# Patient Record
Sex: FEMALE | Race: WHITE | Employment: FULL TIME | URBAN - METROPOLITAN AREA
[De-identification: names, ages, dates, MRNs, and addresses within clinical notes are randomized per-mention and may not be internally consistent; named-entity substitution may affect disease eponyms.]

---

## 2017-04-21 ENCOUNTER — HOSPITAL ENCOUNTER (OUTPATIENT)
Dept: GENERAL RADIOLOGY | Age: 62
Discharge: HOME | End: 2017-04-21
Attending: PHYSICIAN ASSISTANT
Payer: COMMERCIAL

## 2017-04-21 ENCOUNTER — OFFICE VISIT (OUTPATIENT)
Dept: FAMILY MEDICINE CLINIC | Facility: CLINIC | Age: 62
End: 2017-04-21

## 2017-04-21 VITALS
DIASTOLIC BLOOD PRESSURE: 60 MMHG | BODY MASS INDEX: 22 KG/M2 | RESPIRATION RATE: 16 BRPM | SYSTOLIC BLOOD PRESSURE: 108 MMHG | WEIGHT: 118 LBS | HEART RATE: 64 BPM

## 2017-04-21 DIAGNOSIS — M79.672 LEFT FOOT PAIN: Primary | ICD-10-CM

## 2017-04-21 DIAGNOSIS — M79.672 LEFT FOOT PAIN: ICD-10-CM

## 2017-04-21 DIAGNOSIS — M25.572 ACUTE LEFT ANKLE PAIN: ICD-10-CM

## 2017-04-21 PROCEDURE — 73610 X-RAY EXAM OF ANKLE: CPT

## 2017-04-21 PROCEDURE — 99213 OFFICE O/P EST LOW 20 MIN: CPT | Performed by: PHYSICIAN ASSISTANT

## 2017-04-21 PROCEDURE — 73630 X-RAY EXAM OF FOOT: CPT

## 2017-04-21 NOTE — PROGRESS NOTES
CC:  Patient presents with:  Sprain: possible sprain on Left ankle.  The patient states that her ankle twisted when she fell this morning      HPI: Sarai presents for complaints of left foot and ankle pain/swelling after falling while climbing stairs this No visible/palpable deformities. ROM limited in the ankle due to pain. No TTP over the Achilles or base of the 5th metatarsal. CV/neuro intact.   Neurological:  A&O x 3; normal gait and balance  Skin: Warm, dry, no rashes, lesions, or discoloration  Psychia

## 2017-04-24 ENCOUNTER — TELEPHONE (OUTPATIENT)
Dept: FAMILY MEDICINE CLINIC | Facility: CLINIC | Age: 62
End: 2017-04-24

## 2017-04-24 NOTE — TELEPHONE ENCOUNTER
CONCLUSION:  There is an 8 x 2 mm avulsion fracture involving the dorsal distal left painless. There is moderate adjacent soft tissue swelling. There is a 8mm enthesophyte at the insertion of the left Achilles tendon.

## 2017-04-24 NOTE — TELEPHONE ENCOUNTER
Elder Pop told her patient not in [place] so they want to see her tomorrow, I called the patient [facility] for her to call back so she can call Dr Earnest Velasco to schedule an appointment for tomorrow

## 2017-04-24 NOTE — TELEPHONE ENCOUNTER
Spoke with Pt and this and expressed that it is important Pt contact Dr Louise Hernandez office today and get appt for tomorrow.

## 2017-04-24 NOTE — TELEPHONE ENCOUNTER
Called Dr. Duncan Baltazar office and spoke Dr. Luis F Molina per Jg Perry PA-C patient has an left avulsion fx and that she is in a walking boot. Jg Perry PA-C would like to know how soon should patient schedule an appt to see Dr. Luis F Molina.     Dr. Luis F Molina

## 2017-04-24 NOTE — TELEPHONE ENCOUNTER
Pooja Leyva from Dr. Jacquelin Nice office called back and said that per Dr. Justin Zepeda patient has a talus fx and that the patient should schedule an appt in 1 week so next week Monday 5/1/17 to see Dr. Justin Zepeda and for patient to stay in the boot.     Notified [facility]

## 2017-04-24 NOTE — TELEPHONE ENCOUNTER
Patient called in to check her next step for her ankle.   She saw Teena Mcqueen and she just wants to know if she needs to see an orthopedic  Please call to discuss

## 2017-10-23 ENCOUNTER — TELEPHONE (OUTPATIENT)
Dept: FAMILY MEDICINE CLINIC | Facility: CLINIC | Age: 62
End: 2017-10-23

## 2017-10-23 DIAGNOSIS — Z12.11 COLON CANCER SCREENING: Primary | ICD-10-CM

## 2017-10-23 NOTE — TELEPHONE ENCOUNTER
Patient scheduled physical for 12/5 with Dr. Nicole Bernard. Patient would like to know if referral for colonoscopy can be entered before her appt. Or does patient need to wait until seen by Dr. Nicole Bernard?

## 2017-10-23 NOTE — TELEPHONE ENCOUNTER
Pt requested referral to GI for Colonoscopy. LOV 04/21/17 and next appt 12/05/17 for physical. Will you give referral ?  Routed to Dr Naldo Costa.

## 2017-11-25 ENCOUNTER — TELEPHONE (OUTPATIENT)
Dept: FAMILY MEDICINE CLINIC | Facility: CLINIC | Age: 62
End: 2017-11-25

## 2017-11-25 NOTE — TELEPHONE ENCOUNTER
Got message about eye issue on call. Spray paint in eye. Has been rinsing.   Eye irritated, but vision does not seem to be affected  She feels fine now  Recommended if irritation persists or if vision changes, get emergency room help  Hold cold compress t

## 2017-12-05 ENCOUNTER — OFFICE VISIT (OUTPATIENT)
Dept: FAMILY MEDICINE CLINIC | Facility: CLINIC | Age: 62
End: 2017-12-05

## 2017-12-05 VITALS
DIASTOLIC BLOOD PRESSURE: 60 MMHG | HEART RATE: 84 BPM | BODY MASS INDEX: 21.25 KG/M2 | SYSTOLIC BLOOD PRESSURE: 90 MMHG | HEIGHT: 61.5 IN | TEMPERATURE: 98 F | WEIGHT: 114 LBS

## 2017-12-05 DIAGNOSIS — Z00.00 ROUTINE GENERAL MEDICAL EXAMINATION AT A HEALTH CARE FACILITY: Primary | ICD-10-CM

## 2017-12-05 DIAGNOSIS — Z12.11 COLON CANCER SCREENING: ICD-10-CM

## 2017-12-05 DIAGNOSIS — L85.3 DRY SKIN: ICD-10-CM

## 2017-12-05 DIAGNOSIS — Z78.0 POSTMENOPAUSAL ESTROGEN DEFICIENCY: ICD-10-CM

## 2017-12-05 DIAGNOSIS — Z12.31 VISIT FOR SCREENING MAMMOGRAM: ICD-10-CM

## 2017-12-05 DIAGNOSIS — Z00.00 LABORATORY EXAM ORDERED AS PART OF ROUTINE GENERAL MEDICAL EXAMINATION: ICD-10-CM

## 2017-12-05 PROCEDURE — 99396 PREV VISIT EST AGE 40-64: CPT | Performed by: FAMILY MEDICINE

## 2017-12-05 NOTE — PROGRESS NOTES
HPI:   Geovanna Allen is a 58year old female who presents for a complete physical exam.   Last pap:  2/2016, normal, neg HPV  Last mammogram:  1/2014; declines further mammogram.    Previous colonoscopy:  Age 48, does not want to have colon now.   Willing to yes.      REVIEW OF SYSTEMS:   GENERAL: feels well otherwise  SKIN: denies any unusual skin lesions  EYES:no vision problems  LUNGS: denies shortness of breath  CARDIOVASCULAR: denies chest pain  GI: denies abdominal pain,  No constipation or diarrhea  : requested or ordered in this encounter    Imaging & Consults:  XR DEXA BONE DENSITOMETRY (CPT=77080)  LUIS SCREENING BILAT (CPT=77067)

## 2017-12-09 ENCOUNTER — APPOINTMENT (OUTPATIENT)
Dept: LAB | Facility: HOSPITAL | Age: 62
End: 2017-12-09
Attending: FAMILY MEDICINE
Payer: COMMERCIAL

## 2017-12-09 DIAGNOSIS — Z12.11 COLON CANCER SCREENING: ICD-10-CM

## 2017-12-09 PROCEDURE — 82272 OCCULT BLD FECES 1-3 TESTS: CPT

## 2018-07-27 ENCOUNTER — TELEPHONE (OUTPATIENT)
Dept: FAMILY MEDICINE CLINIC | Facility: CLINIC | Age: 63
End: 2018-07-27

## 2018-07-27 DIAGNOSIS — Z80.3 FAMILY HISTORY OF BREAST CANCER IN SISTER: Primary | ICD-10-CM

## 2018-07-27 NOTE — TELEPHONE ENCOUNTER
[facility] [address] [facility]  [place], [address]  [phone]  Called and talked to patient gave her the information to contact genetic counselor

## 2018-07-27 NOTE — TELEPHONE ENCOUNTER
Sister  of breast cancer a few weeks so she would like to be tested (el) for the cancer gene. Wants to know who she would see for this?

## 2018-08-03 ENCOUNTER — GENETICS ENCOUNTER (OUTPATIENT)
Dept: GENETICS | Facility: HOSPITAL | Age: 63
End: 2018-08-03
Attending: GENETIC COUNSELOR, MS
Payer: COMMERCIAL

## 2018-08-03 DIAGNOSIS — Z80.3 FAMILY HISTORY OF BREAST CANCER: Primary | ICD-10-CM

## 2018-08-03 DIAGNOSIS — Z80.0 FAMILY HISTORY OF COLORECTAL CANCER: ICD-10-CM

## 2018-08-03 PROCEDURE — 96040 HC GENETIC COUNSELING EA 30 MIN: CPT | Performed by: GENETIC COUNSELOR, MS

## 2018-08-03 NOTE — PROGRESS NOTES
Referring Provider: Dr. Bess Hollins     Reason for Referral:  Ms. Gabby Smith was referred for genetic counseling because of a family history of breast and colorectal cancer. Ms. Torsten Huggins is a 61year-old woman of [place] descent.       Medical H cancer. There is no known Ashkenazi Rastafari ancestry. Psychosocial History:   Ms. Bianca Echevarria is semi-retired and works part-time as a . She is . Counseling: The following information was discussed with Ms. Bianca Echevarria.     Genetic cancer. Ms. Timothy Maloney was also concerned about her risk for breast cancer, given a mother and a sister with breast cancer. Genetic Testing (Panel):   The pros, cons, and limitations of genetic testing were discussed including the potential implication she would follow the recommendations in the event of a mutation. MsSonja Erik Piper indicated that if she had a mutation in a high risk breast cancer gene, she would likely pursue risk-reducing mastectomy, as she feels that mammograms are often inaccurate.    She in the sample. I anticipate that Ms. Vaz's results will be available within 2-3 weeks and will call her with the results. Results will also be communicated to Ms. Vaz’s physicians. Approximately 50 minutes was spent in consultation with Ms. Blayne Jackson

## 2018-08-15 ENCOUNTER — GENETICS ENCOUNTER (OUTPATIENT)
Dept: GENETICS | Facility: HOSPITAL | Age: 63
End: 2018-08-15
Attending: GENETIC COUNSELOR, MS
Payer: COMMERCIAL

## 2018-08-15 NOTE — PROGRESS NOTES
Referring Provider: Dr. Ирина Gallegos     Reason for Referral:  I met with Ms. Nesha Larose  briefly today for coordination and consenting for genetic testing for hereditary cancer.     Ms. Maxine Shoemaker had previously met with me on 8/3/18 to discuss genetic testi

## 2018-08-31 ENCOUNTER — GENETICS ENCOUNTER (OUTPATIENT)
Dept: HEMATOLOGY/ONCOLOGY | Facility: HOSPITAL | Age: 63
End: 2018-08-31

## 2018-08-31 NOTE — PROGRESS NOTES
Referring Provider: Dr. Rachid Spencer     Reason for Referral:  Ms. Loi Del Real had [facility] Common Hereditary Cancers panel genetic testing performed on 08/15/18 because of a family history of breast and colon cancer.        Genetic Testing Result:  No has not been reported in clinvar. At this time, no alteration in Ms. Vaz’s medical recommendations should be made based on the variant.   As more families are tested and/or functional studies are performed, it is possible that the variant will be reclass

## 2018-10-09 PROBLEM — D12.2 BENIGN NEOPLASM OF ASCENDING COLON: Status: ACTIVE | Noted: 2018-10-09

## 2018-10-09 PROBLEM — Z80.0 FAMILY HISTORY OF MALIGNANT NEOPLASM OF DIGESTIVE ORGAN: Status: ACTIVE | Noted: 2018-10-09

## 2018-10-09 PROBLEM — Z83.719 FAMILY HISTORY OF COLONIC POLYPS: Status: ACTIVE | Noted: 2018-10-09

## 2018-10-09 PROBLEM — Z83.71 FAMILY HISTORY OF COLONIC POLYPS: Status: ACTIVE | Noted: 2018-10-09

## 2018-10-09 PROBLEM — Z12.11 SPECIAL SCREENING FOR MALIGNANT NEOPLASM OF COLON: Status: ACTIVE | Noted: 2018-10-09

## 2019-03-07 ENCOUNTER — OFFICE VISIT (OUTPATIENT)
Dept: FAMILY MEDICINE CLINIC | Facility: CLINIC | Age: 64
End: 2019-03-07
Payer: COMMERCIAL

## 2019-03-07 VITALS
HEIGHT: 61.5 IN | BODY MASS INDEX: 22.74 KG/M2 | RESPIRATION RATE: 16 BRPM | OXYGEN SATURATION: 96 % | SYSTOLIC BLOOD PRESSURE: 104 MMHG | WEIGHT: 122 LBS | TEMPERATURE: 98 F | DIASTOLIC BLOOD PRESSURE: 64 MMHG | HEART RATE: 89 BPM

## 2019-03-07 DIAGNOSIS — J06.9 UPPER RESPIRATORY TRACT INFECTION, UNSPECIFIED TYPE: Primary | ICD-10-CM

## 2019-03-07 PROCEDURE — 99213 OFFICE O/P EST LOW 20 MIN: CPT | Performed by: PHYSICIAN ASSISTANT

## 2019-03-07 PROCEDURE — 94640 AIRWAY INHALATION TREATMENT: CPT | Performed by: PHYSICIAN ASSISTANT

## 2019-03-07 RX ORDER — ALBUTEROL SULFATE 90 UG/1
2 AEROSOL, METERED RESPIRATORY (INHALATION) EVERY 4 HOURS PRN
Qty: 1 INHALER | Refills: 6 | Status: SHIPPED | OUTPATIENT
Start: 2019-03-07 | End: 2019-06-11 | Stop reason: ALTCHOICE

## 2019-03-07 RX ORDER — ALBUTEROL SULFATE 2.5 MG/3ML
2.5 SOLUTION RESPIRATORY (INHALATION) ONCE
Status: COMPLETED | OUTPATIENT
Start: 2019-03-07 | End: 2019-03-07

## 2019-03-07 RX ADMIN — ALBUTEROL SULFATE 2.5 MG: 2.5 SOLUTION RESPIRATORY (INHALATION) at 12:21:00

## 2019-03-07 NOTE — PROGRESS NOTES
CC: Cough     HISTORY OF PRESENT ILLNESS  Zeferino Eller is a 61year old female who presents for evaluation of cough. For the past week, she has been having cough with associated body aches, nasal congestion, and chills.   She feels that most of her symptoms Posterior oropharynx clear without exudate or erythema. NECK: Supple without lymphadenopathy. CARDIOVASCULAR: Regular rate and rhythm, no murmurs/ rubs/ gallops. PULMONARY: Normal effort on room air. Clear to auscultation bilaterally.  No adventitious br

## 2019-06-11 ENCOUNTER — OFFICE VISIT (OUTPATIENT)
Dept: FAMILY MEDICINE CLINIC | Facility: CLINIC | Age: 64
End: 2019-06-11
Payer: COMMERCIAL

## 2019-06-11 VITALS
BODY MASS INDEX: 21.93 KG/M2 | SYSTOLIC BLOOD PRESSURE: 110 MMHG | RESPIRATION RATE: 16 BRPM | DIASTOLIC BLOOD PRESSURE: 60 MMHG | TEMPERATURE: 98 F | WEIGHT: 119.19 LBS | HEART RATE: 84 BPM | HEIGHT: 62 IN

## 2019-06-11 DIAGNOSIS — K11.20 INFECTION OF PAROTID GLAND: Primary | ICD-10-CM

## 2019-06-11 PROCEDURE — 99214 OFFICE O/P EST MOD 30 MIN: CPT | Performed by: FAMILY MEDICINE

## 2019-06-11 RX ORDER — AMOXICILLIN 500 MG/1
1 CAPSULE ORAL 3 TIMES DAILY
Refills: 0 | COMMUNITY
Start: 2019-06-10 | End: 2019-06-11 | Stop reason: ALTCHOICE

## 2019-06-11 RX ORDER — CEFUROXIME AXETIL 250 MG/1
250 TABLET ORAL 2 TIMES DAILY
Qty: 20 TABLET | Refills: 0 | Status: SHIPPED | OUTPATIENT
Start: 2019-06-11 | End: 2019-10-17

## 2019-06-11 NOTE — PROGRESS NOTES
Clement Macario is a 61year old female. S:  Patient presents today with the following concerns:  Swelling (R facial swelling anterior to R ear and neck beginning 2 days ago. Pain with chewing.  Lethargic, fever and chills)  Began Sunday morning when tried affect, and behavior are normal.  SKIN: no rashes,no suspicious lesions  HEENT: atraumatic, normocephalic,ears and throat are clear. No oral lesions. The right parotid gland is swollen and tender to the touch. No other salivary gland enlargement.   EYES:

## 2019-09-09 ENCOUNTER — TELEPHONE (OUTPATIENT)
Dept: FAMILY MEDICINE CLINIC | Facility: CLINIC | Age: 64
End: 2019-09-09

## 2019-10-17 ENCOUNTER — OFFICE VISIT (OUTPATIENT)
Dept: FAMILY MEDICINE CLINIC | Facility: CLINIC | Age: 64
End: 2019-10-17
Payer: COMMERCIAL

## 2019-10-17 VITALS
RESPIRATION RATE: 16 BRPM | HEART RATE: 72 BPM | SYSTOLIC BLOOD PRESSURE: 100 MMHG | WEIGHT: 117 LBS | DIASTOLIC BLOOD PRESSURE: 60 MMHG | BODY MASS INDEX: 21.81 KG/M2 | HEIGHT: 61.25 IN | TEMPERATURE: 98 F

## 2019-10-17 DIAGNOSIS — M72.2 PLANTAR FASCIITIS, BILATERAL: ICD-10-CM

## 2019-10-17 DIAGNOSIS — Z12.31 VISIT FOR SCREENING MAMMOGRAM: ICD-10-CM

## 2019-10-17 DIAGNOSIS — Z11.51 SCREENING FOR HUMAN PAPILLOMAVIRUS (HPV): ICD-10-CM

## 2019-10-17 DIAGNOSIS — Z00.00 ROUTINE GENERAL MEDICAL EXAMINATION AT A HEALTH CARE FACILITY: Primary | ICD-10-CM

## 2019-10-17 DIAGNOSIS — E78.5 DYSLIPIDEMIA: ICD-10-CM

## 2019-10-17 DIAGNOSIS — G56.03 BILATERAL CARPAL TUNNEL SYNDROME: ICD-10-CM

## 2019-10-17 PROCEDURE — 88175 CYTOPATH C/V AUTO FLUID REDO: CPT | Performed by: FAMILY MEDICINE

## 2019-10-17 PROCEDURE — 87624 HPV HI-RISK TYP POOLED RSLT: CPT | Performed by: FAMILY MEDICINE

## 2019-10-17 PROCEDURE — 99396 PREV VISIT EST AGE 40-64: CPT | Performed by: FAMILY MEDICINE

## 2019-10-17 NOTE — PROGRESS NOTES
HPI:   Yazmin Vallejo is a 59year old female who presents for a complete physical exam.   Last pap:  2/2016, normal, neg HPV - done today 10/17/19  Last mammogram:  1/2014; has declined mammogram   Previous colonoscopy:  10/9/18 - repeat in 3 years.   Previo Date   •      • COLONOSCOPY     • LUIS BIOPSY STEREO NODULE 1 SITE RIGHT (CPT=19081)      benign      Family History   Problem Relation Age of Onset   • Cancer Mother         colon, lung, breast, cervix   • Colon Cancer Mother    • Breast Cance uterus is normal size, shape, consistency and nontender, ADNEXA: normal adnexa in size, nontender and no masses  EXTREMITIES: no edema    TTP at insertion of plantar fascia b/l  ASSESSMENT AND PLAN:   Jl Bills is a 59year old female who presents for a

## 2019-11-15 ENCOUNTER — HOSPITAL ENCOUNTER (OUTPATIENT)
Dept: MAMMOGRAPHY | Facility: HOSPITAL | Age: 64
Discharge: HOME | End: 2019-11-15
Attending: FAMILY MEDICINE
Payer: COMMERCIAL

## 2019-11-15 DIAGNOSIS — Z12.31 VISIT FOR SCREENING MAMMOGRAM: ICD-10-CM

## 2019-11-15 PROCEDURE — 77063 BREAST TOMOSYNTHESIS BI: CPT | Performed by: FAMILY MEDICINE

## 2019-11-15 PROCEDURE — 77067 SCR MAMMO BI INCL CAD: CPT | Performed by: FAMILY MEDICINE

## 2019-11-18 ENCOUNTER — HOSPITAL ENCOUNTER (OUTPATIENT)
Dept: CT IMAGING | Facility: HOSPITAL | Age: 64
Discharge: HOME | End: 2019-11-18
Attending: FAMILY MEDICINE

## 2019-11-18 DIAGNOSIS — Z13.9 ENCOUNTER FOR SCREENING: ICD-10-CM

## 2019-12-10 ENCOUNTER — TELEPHONE (OUTPATIENT)
Dept: FAMILY MEDICINE CLINIC | Facility: CLINIC | Age: 64
End: 2019-12-10

## 2019-12-10 NOTE — TELEPHONE ENCOUNTER
fyi   Patient is scheduled for tomorrow to see Lalitha Mendoza for a fall she had last Thursday. Patient stated she fell on her back and now she's experiencing upper back and neck pain.

## 2019-12-10 NOTE — TELEPHONE ENCOUNTER
Called and talked to patient she is taking ibuprofen 200 mg BID I encouraged her to increase it to 600 mg BID and we can see her tomorrow

## 2019-12-11 ENCOUNTER — OFFICE VISIT (OUTPATIENT)
Dept: FAMILY MEDICINE CLINIC | Facility: CLINIC | Age: 64
End: 2019-12-11
Payer: COMMERCIAL

## 2019-12-11 VITALS
HEIGHT: 61 IN | HEART RATE: 60 BPM | SYSTOLIC BLOOD PRESSURE: 100 MMHG | TEMPERATURE: 99 F | DIASTOLIC BLOOD PRESSURE: 60 MMHG | BODY MASS INDEX: 22.28 KG/M2 | WEIGHT: 118 LBS

## 2019-12-11 DIAGNOSIS — M79.18 PAIN OF PARASPINAL MUSCLE: ICD-10-CM

## 2019-12-11 DIAGNOSIS — M53.3 SACRAL PAIN: ICD-10-CM

## 2019-12-11 DIAGNOSIS — S46.819A STRAIN OF TRAPEZIUS MUSCLE, UNSPECIFIED LATERALITY, INITIAL ENCOUNTER: ICD-10-CM

## 2019-12-11 DIAGNOSIS — R51.9 DAILY HEADACHE: ICD-10-CM
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
## 2019-12-11 PROCEDURE — 99214 OFFICE O/P EST MOD 30 MIN: CPT | Performed by: PHYSICIAN ASSISTANT

## 2019-12-11 RX ORDER — CYCLOBENZAPRINE HCL 5 MG
5 TABLET ORAL NIGHTLY PRN
Qty: 20 TABLET | Refills: 0 | Status: SHIPPED | OUTPATIENT
Start: 2019-12-11 | End: 2020-11-03 | Stop reason: ALTCHOICE

## 2019-12-11 NOTE — PROGRESS NOTES
Patient presents with:  Fall: fell last thursday,  still sore on left side and upper shoulders and neck  Headache: constant dull headache since fall 6 days ago       [address] [place] [address] Lisseth is a 59year old female who presents for evaluati appropriately dressed. HEENT: Normocephalic, atraumatic. PERRL. Moist mucous membranes. Posterior oropharynx pink without exudates or inflammation. NECK: Supple without lymphadenopathy. CARDIOVASCULAR: Regular rate and rhythm.   PULMONOLOGY: Normal effor

## 2019-12-11 NOTE — PATIENT INSTRUCTIONS
Heat 15 minutes 3 times a day- upper back/ mid back  Ice 15 minutes - tail bone if needed. Topical analgesics (pain)- biofreeze, lidocaine gel, patches, aspercreme, bengay.     If you feel that you need an anti-inflammatory or if the muscle relaxer does

## 2020-02-10 ENCOUNTER — TELEPHONE (OUTPATIENT)
Dept: FAMILY MEDICINE CLINIC | Facility: CLINIC | Age: 65
End: 2020-02-10

## 2020-02-10 NOTE — TELEPHONE ENCOUNTER
Patient is calling asking for a recommendation on a dermatologist. Patient has a skin tag in eye that is getting bigger and would like to have it removed.

## 2020-02-10 NOTE — TELEPHONE ENCOUNTER
[facility]    [address] [address] [address]  [place], [address] [place] [address] Rd    [phone]    Patient notified.

## 2020-07-08 ENCOUNTER — LAB ENCOUNTER (OUTPATIENT)
Dept: LAB | Facility: HOSPITAL | Age: 65
End: 2020-07-08
Attending: PHYSICIAN ASSISTANT
Payer: COMMERCIAL

## 2020-07-08 ENCOUNTER — TELEPHONE (OUTPATIENT)
Dept: FAMILY MEDICINE CLINIC | Facility: CLINIC | Age: 65
End: 2020-07-08

## 2020-07-08 DIAGNOSIS — J02.9 SORE THROAT: ICD-10-CM

## 2020-07-08 DIAGNOSIS — R52 BODY ACHES: ICD-10-CM

## 2020-07-08 DIAGNOSIS — R52 BODY ACHES: Primary | ICD-10-CM

## 2020-07-08 NOTE — TELEPHONE ENCOUNTER
Called and told patient that someone from the hospital will call her and set up a time for her to come in to get tested. Then we will call her and set up a virtual visit with Yuri Nolen.

## 2020-07-08 NOTE — TELEPHONE ENCOUNTER
OK will order testing now. Will need to follow up via virtual visit once results return if still having symptoms/ returns positive.

## 2020-07-08 NOTE — TELEPHONE ENCOUNTER
Pt requesting to be tested for Covid 19 as she is having symptoms (body aches,sore throat,fatigued,headache)

## 2020-07-14 LAB — SARS-COV-2 BY PCR: NOT DETECTED

## 2020-07-16 NOTE — PROGRESS NOTES
Results reviewed with patient per phone. Patient verbalized understanding. Pt no longer has symptoms

## 2020-09-04 ENCOUNTER — TELEPHONE (OUTPATIENT)
Dept: FAMILY MEDICINE CLINIC | Facility: CLINIC | Age: 65
End: 2020-09-04

## 2020-09-04 DIAGNOSIS — Z00.00 ROUTINE GENERAL MEDICAL EXAMINATION AT A HEALTH CARE FACILITY: Primary | ICD-10-CM

## 2020-09-04 NOTE — TELEPHONE ENCOUNTER
No message in this routed call
Please enter lab orders for the patient's upcoming physical appointment. Physical scheduled:    Your appointments     Date & Time Appointment Department [facility])    Nov 03, 2020  8:45 AM CST Physical - Established with Dell Ibanez MD [address] [place] [address]
Pt is now 72. Will this be a physical or a medicare wellness?
This will be a physical. Pt is still working and does not qualify for Medicare part B
No

## 2020-11-03 ENCOUNTER — OFFICE VISIT (OUTPATIENT)
Dept: FAMILY MEDICINE CLINIC | Facility: CLINIC | Age: 65
End: 2020-11-03
Payer: COMMERCIAL

## 2020-11-03 VITALS
DIASTOLIC BLOOD PRESSURE: 78 MMHG | WEIGHT: 115.19 LBS | BODY MASS INDEX: 21.2 KG/M2 | SYSTOLIC BLOOD PRESSURE: 120 MMHG | HEIGHT: 61.75 IN | RESPIRATION RATE: 16 BRPM | HEART RATE: 68 BPM | TEMPERATURE: 98 F

## 2020-11-03 DIAGNOSIS — Z00.00 ROUTINE GENERAL MEDICAL EXAMINATION AT A HEALTH CARE FACILITY: Primary | ICD-10-CM

## 2020-11-03 DIAGNOSIS — Z12.31 VISIT FOR SCREENING MAMMOGRAM: ICD-10-CM

## 2020-11-03 DIAGNOSIS — H93.11 TINNITUS OF RIGHT EAR: ICD-10-CM

## 2020-11-03 PROBLEM — Z12.11 SPECIAL SCREENING FOR MALIGNANT NEOPLASM OF COLON: Status: RESOLVED | Noted: 2018-10-09 | Resolved: 2020-11-03

## 2020-11-03 PROCEDURE — 3078F DIAST BP <80 MM HG: CPT | Performed by: FAMILY MEDICINE

## 2020-11-03 PROCEDURE — 3074F SYST BP LT 130 MM HG: CPT | Performed by: FAMILY MEDICINE

## 2020-11-03 PROCEDURE — 99397 PER PM REEVAL EST PAT 65+ YR: CPT | Performed by: FAMILY MEDICINE

## 2020-11-03 PROCEDURE — 3008F BODY MASS INDEX DOCD: CPT | Performed by: FAMILY MEDICINE

## 2020-11-03 PROCEDURE — 99072 ADDL SUPL MATRL&STAF TM PHE: CPT | Performed by: FAMILY MEDICINE

## 2020-11-03 RX ORDER — MULTIVIT WITH MINERALS/LUTEIN
1000 TABLET ORAL DAILY
COMMUNITY

## 2020-11-03 RX ORDER — MULTIVITAMIN
1 TABLET ORAL DAILY
COMMUNITY

## 2020-11-03 NOTE — PROGRESS NOTES
HPI:   Alvaro Mata is a 72year old female who presents for a complete physical exam.   Last pap:  10/17/19  Last mammogram:  11/2019   Previous colonoscopy:  10/9/18 - repeat in 3 years. Previous DEXA:  Previously ordered, not completed.   Current or pre Medical History:   Diagnosis Date   • Pain in joints       Past Surgical History:   Procedure Laterality Date   •      • COLONOSCOPY     • LUIS BIOPSY STEREO NODULE 1 SITE RIGHT (CPT=19081)      benign      Family History   Problem Relation Age diagnosis)  Visit for screening mammogram  Tinnitus of right ear  · Pap UTD  · Mammogram:  Recommend yearly - pt does not plan to have done this year. Ordered if she changes her mind. · Dexascan:  /. Recommended daily Calcium and Vit D supplements.   · L

## 2021-02-14 DIAGNOSIS — Z23 NEED FOR VACCINATION: ICD-10-CM

## 2021-10-29 ENCOUNTER — LAB ENCOUNTER (OUTPATIENT)
Dept: LAB | Age: 66
End: 2021-10-29
Attending: INTERNAL MEDICINE
Payer: COMMERCIAL

## 2021-10-29 DIAGNOSIS — Z01.818 PRE-OP TESTING: ICD-10-CM

## 2021-11-01 PROBLEM — D12.3 BENIGN NEOPLASM OF TRANSVERSE COLON: Status: ACTIVE | Noted: 2021-11-01

## 2021-11-01 PROBLEM — Z86.010 HISTORY OF ADENOMATOUS POLYP OF COLON: Status: ACTIVE | Noted: 2021-11-01

## 2021-11-01 PROBLEM — Z80.0 FAMILY HISTORY OF COLON CANCER: Status: ACTIVE | Noted: 2021-11-01

## 2021-11-01 PROBLEM — D12.8 BENIGN NEOPLASM OF RECTUM: Status: ACTIVE | Noted: 2021-11-01

## 2021-11-01 PROBLEM — Z86.0101 HISTORY OF ADENOMATOUS POLYP OF COLON: Status: ACTIVE | Noted: 2021-11-01

## 2021-11-01 PROBLEM — D12.4 BENIGN NEOPLASM OF DESCENDING COLON: Status: ACTIVE | Noted: 2021-11-01

## 2022-11-12 LAB
AMB EXT CHOLESTEROL, TOTAL: 235 MG/DL
AMB EXT HDL CHOLESTEROL: 53 MG/DL
AMB EXT LDL CHOLESTEROL, DIRECT: 170 MG/DL
AMB EXT TRIGLYCERIDES: 72 MG/DL

## 2022-11-14 ENCOUNTER — TELEPHONE (OUTPATIENT)
Dept: FAMILY MEDICINE CLINIC | Facility: CLINIC | Age: 67
End: 2022-11-14

## 2022-11-14 NOTE — TELEPHONE ENCOUNTER
Received fax from [address] [place] [address] with patient's test results, placed in Dr. Stacey Brown in box.  Patient has physical 12/10/22

## 2022-11-22 LAB — AMB EXT GLUCOSE: 85 MG/DL

## 2022-11-22 NOTE — TELEPHONE ENCOUNTER
Labs reviewed.   Metabolic panel normal, including glucose 85  Triglycerides 72  Total cholesterol 235  HDL 53    Normal blood count

## 2022-12-10 ENCOUNTER — OFFICE VISIT (OUTPATIENT)
Dept: FAMILY MEDICINE CLINIC | Facility: CLINIC | Age: 67
End: 2022-12-10
Payer: COMMERCIAL

## 2022-12-10 VITALS
DIASTOLIC BLOOD PRESSURE: 60 MMHG | HEART RATE: 72 BPM | WEIGHT: 117.38 LBS | BODY MASS INDEX: 21.88 KG/M2 | TEMPERATURE: 97 F | RESPIRATION RATE: 16 BRPM | SYSTOLIC BLOOD PRESSURE: 104 MMHG | HEIGHT: 61.5 IN

## 2022-12-10 DIAGNOSIS — Z11.51 SCREENING FOR HPV (HUMAN PAPILLOMAVIRUS): ICD-10-CM

## 2022-12-10 DIAGNOSIS — Z78.0 POSTMENOPAUSAL ESTROGEN DEFICIENCY: ICD-10-CM

## 2022-12-10 DIAGNOSIS — E78.00 PURE HYPERCHOLESTEROLEMIA: ICD-10-CM

## 2022-12-10 DIAGNOSIS — Z12.31 VISIT FOR SCREENING MAMMOGRAM: ICD-10-CM

## 2022-12-10 DIAGNOSIS — Z00.00 ROUTINE GENERAL MEDICAL EXAMINATION AT A HEALTH CARE FACILITY: Primary | ICD-10-CM

## 2022-12-10 DIAGNOSIS — N95.0 POSTMENOPAUSAL BLEEDING: ICD-10-CM

## 2022-12-10 DIAGNOSIS — R10.2 ADNEXAL PAIN: ICD-10-CM

## 2022-12-10 DIAGNOSIS — Z12.4 SCREENING FOR CERVICAL CANCER: ICD-10-CM

## 2022-12-10 DIAGNOSIS — R31.9 HEMATURIA, UNSPECIFIED TYPE: ICD-10-CM

## 2022-12-10 PROBLEM — D12.4 BENIGN NEOPLASM OF DESCENDING COLON: Status: RESOLVED | Noted: 2021-11-01 | Resolved: 2022-12-10

## 2022-12-10 PROBLEM — Z80.0 FAMILY HISTORY OF MALIGNANT NEOPLASM OF DIGESTIVE ORGAN: Status: RESOLVED | Noted: 2018-10-09 | Resolved: 2022-12-10

## 2022-12-10 PROBLEM — Z83.719 FAMILY HISTORY OF COLONIC POLYPS: Status: RESOLVED | Noted: 2018-10-09 | Resolved: 2022-12-10

## 2022-12-10 PROBLEM — D12.8 BENIGN NEOPLASM OF RECTUM: Status: RESOLVED | Noted: 2021-11-01 | Resolved: 2022-12-10

## 2022-12-10 PROBLEM — Z83.71 FAMILY HISTORY OF COLONIC POLYPS: Status: RESOLVED | Noted: 2018-10-09 | Resolved: 2022-12-10

## 2022-12-10 PROBLEM — D12.2 BENIGN NEOPLASM OF ASCENDING COLON: Status: RESOLVED | Noted: 2018-10-09 | Resolved: 2022-12-10

## 2022-12-10 PROBLEM — D12.3 BENIGN NEOPLASM OF TRANSVERSE COLON: Status: RESOLVED | Noted: 2021-11-01 | Resolved: 2022-12-10

## 2022-12-10 LAB
APPEARANCE: YELLOW
BILIRUBIN: NEGATIVE
GLUCOSE (URINE DIPSTICK): NEGATIVE MG/DL
KETONES (URINE DIPSTICK): NEGATIVE MG/DL
LEUKOCYTES: NEGATIVE
MULTISTIX LOT#: NORMAL NUMERIC
NITRITE, URINE: NEGATIVE
OCCULT BLOOD: NEGATIVE
PH, URINE: 6.5 (ref 4.5–8)
PROTEIN (URINE DIPSTICK): NEGATIVE MG/DL
SPECIFIC GRAVITY: 1 (ref 1–1.03)
URINE-COLOR: CLEAR
UROBILINOGEN,SEMI-QN: 0.2 MG/DL (ref 0–1.9)

## 2022-12-10 PROCEDURE — 81003 URINALYSIS AUTO W/O SCOPE: CPT | Performed by: FAMILY MEDICINE

## 2022-12-10 PROCEDURE — 3078F DIAST BP <80 MM HG: CPT | Performed by: FAMILY MEDICINE

## 2022-12-10 PROCEDURE — 99397 PER PM REEVAL EST PAT 65+ YR: CPT | Performed by: FAMILY MEDICINE

## 2022-12-10 PROCEDURE — 87624 HPV HI-RISK TYP POOLED RSLT: CPT | Performed by: FAMILY MEDICINE

## 2022-12-10 PROCEDURE — 3074F SYST BP LT 130 MM HG: CPT | Performed by: FAMILY MEDICINE

## 2022-12-10 PROCEDURE — 3008F BODY MASS INDEX DOCD: CPT | Performed by: FAMILY MEDICINE

## 2022-12-10 NOTE — PATIENT INSTRUCTIONS
A heart scan, a CT scan of the heart, is the safest and most accurate screening tool for detecting the early build-up of calcium in the coronary arteries, the most common cause of heart disease. This simple, painless and potentially lifesaving test takes just 15 minutes.     To schedule call [phone]    Heart scan locations:  [address] [place] [address] (enter through the Emergency Dept.)    Make an appointment for a heart scan if you are male over age 36 or a female over age 39, and have one or more of these risk factors:  High blood pressure  High cholesterol  Smoking  Obesity  Diabetes  Family history of heart disease

## 2022-12-12 LAB — HPV I/H RISK 1 DNA SPEC QL NAA+PROBE: NEGATIVE

## 2022-12-23 ENCOUNTER — HOSPITAL ENCOUNTER (OUTPATIENT)
Dept: CT IMAGING | Age: 67
Discharge: HOME | End: 2022-12-23
Attending: FAMILY MEDICINE

## 2022-12-23 ENCOUNTER — TELEPHONE (OUTPATIENT)
Dept: FAMILY MEDICINE CLINIC | Facility: CLINIC | Age: 67
End: 2022-12-23

## 2022-12-23 DIAGNOSIS — Z13.6 SCREENING FOR CARDIOVASCULAR CONDITION: ICD-10-CM

## 2022-12-23 NOTE — TELEPHONE ENCOUNTER
Nodule in lung noted on heart scan. No significant change over 2 years. Recommendation is another follow-up in 1 year to ensure no change in size. Please notify patient. No MyChart access.

## 2023-01-06 ENCOUNTER — HOSPITAL ENCOUNTER (OUTPATIENT)
Dept: ULTRASOUND IMAGING | Age: 68
Discharge: HOME | End: 2023-01-06
Attending: FAMILY MEDICINE
Payer: COMMERCIAL

## 2023-01-06 DIAGNOSIS — N95.0 POSTMENOPAUSAL BLEEDING: ICD-10-CM

## 2023-01-06 DIAGNOSIS — R10.2 ADNEXAL PAIN: ICD-10-CM

## 2023-01-06 PROCEDURE — 76830 TRANSVAGINAL US NON-OB: CPT | Performed by: FAMILY MEDICINE

## 2023-01-06 PROCEDURE — 76856 US EXAM PELVIC COMPLETE: CPT | Performed by: FAMILY MEDICINE

## 2023-01-30 ENCOUNTER — OFFICE VISIT (OUTPATIENT)
Facility: CLINIC | Age: 68
End: 2023-01-30
Payer: COMMERCIAL

## 2023-01-30 VITALS
BODY MASS INDEX: 22.37 KG/M2 | HEIGHT: 61.5 IN | HEART RATE: 86 BPM | WEIGHT: 120 LBS | DIASTOLIC BLOOD PRESSURE: 68 MMHG | SYSTOLIC BLOOD PRESSURE: 118 MMHG

## 2023-01-30 DIAGNOSIS — N95.0 POSTMENOPAUSAL BLEEDING: Primary | ICD-10-CM

## 2023-01-31 ENCOUNTER — TELEPHONE (OUTPATIENT)
Facility: CLINIC | Age: 68
End: 2023-01-31

## 2023-01-31 DIAGNOSIS — N95.0 POSTMENOPAUSAL BLEEDING: Primary | ICD-10-CM

## 2023-01-31 DIAGNOSIS — Z01.812 PRE-PROCEDURAL LABORATORY EXAMINATION: ICD-10-CM

## 2023-01-31 NOTE — TELEPHONE ENCOUNTER
Pt aware Surgery has been scheduled for 02/23. Covid order has been placed. Pt verbalized understanding.

## 2023-03-15 ENCOUNTER — TELEPHONE (OUTPATIENT)
Facility: CLINIC | Age: 68
End: 2023-03-15

## 2023-03-15 NOTE — TELEPHONE ENCOUNTER
Per pt request surgery has been canceled. Pt would like to reschedule at the end of may or anytime in June.

## 2024-02-06 ENCOUNTER — OFFICE VISIT (OUTPATIENT)
Dept: FAMILY MEDICINE CLINIC | Facility: CLINIC | Age: 69
End: 2024-02-06
Payer: COMMERCIAL

## 2024-02-06 VITALS
HEIGHT: 61.5 IN | BODY MASS INDEX: 22.44 KG/M2 | HEART RATE: 70 BPM | RESPIRATION RATE: 16 BRPM | DIASTOLIC BLOOD PRESSURE: 66 MMHG | WEIGHT: 120.38 LBS | TEMPERATURE: 98 F | SYSTOLIC BLOOD PRESSURE: 110 MMHG

## 2024-02-06 DIAGNOSIS — E78.00 PURE HYPERCHOLESTEROLEMIA: ICD-10-CM

## 2024-02-06 DIAGNOSIS — Z00.00 ROUTINE GENERAL MEDICAL EXAMINATION AT A HEALTH CARE FACILITY: Primary | ICD-10-CM

## 2024-02-06 PROCEDURE — 3074F SYST BP LT 130 MM HG: CPT | Performed by: FAMILY MEDICINE

## 2024-02-06 PROCEDURE — 3078F DIAST BP <80 MM HG: CPT | Performed by: FAMILY MEDICINE

## 2024-02-06 PROCEDURE — 3008F BODY MASS INDEX DOCD: CPT | Performed by: FAMILY MEDICINE

## 2024-02-06 PROCEDURE — 99397 PER PM REEVAL EST PAT 65+ YR: CPT | Performed by: FAMILY MEDICINE

## 2024-02-06 NOTE — PROGRESS NOTES
HPI:   Sarai Vaz is a 68 year old female who presents for a complete physical exam.   Last pap:  12/10/22  Last mammogram:  2019 - declines  Previous colonoscopy:  2021 - repeat in 5  Years.  [facility]  Previous DEXA:  Previously ordered, not completed.  Current or previous treatment:  /  Family hx of breast, ovarian, cervical or colon CA:  Breast, colon, cervical - mom.  Sister  from breast cancer age 64.  Pt had neg genetic testing.     Immunizations:  Tdap:  declines, Flu:  declines, Prenvar:  declines, Shingles:  declines    Occupation:   for ; retiring in May 2024.     Mom, sister breast cancer.  Pt last mammogram 2019.  Pt declines testing.     Hyperlipidemia:  Has lab work done through work.  LDL elevated  Heart scan 2022:  score 0.     Wt Readings from Last 6 Encounters:   24 120 lb 6.4 oz (54.6 kg)   23 120 lb (54.4 kg)   12/10/22 117 lb 6.4 oz (53.3 kg)   10/12/21 115 lb (52.2 kg)   20 115 lb 3.2 oz (52.3 kg)   19 118 lb (53.5 kg)     Body mass index is 22.38 kg/m².     Cholesterol, Total (mg/dL)   Date Value   2022 235   2019 229   2006 178     HDL Cholesterol (mg/dL)   Date Value   2022 53   2019 55   2006 57     LDL Cholesterol Calc (mg/dL)   Date Value   2006 114 (H)     Triglycerides (mg/dL)   Date Value   2006 33        Current Outpatient Medications   Medication Sig Dispense Refill    Multiple Vitamin (ONE-DAILY MULTI VITAMINS) Oral Tab Take 1 tablet by mouth daily.      NON FORMULARY Take 2 tablets by mouth daily. Calcium-Zinc-Magnesium        Patient Active Problem List   Diagnosis    History of adenomatous polyp of colon    Family history of colon cancer    Pure hypercholesterolemia     Past Medical History:   Diagnosis Date    Atypical mole     Pain in joints       Past Surgical History:   Procedure Laterality Date          COLONOSCOPY      LUIS BIOPSY STEREO NODULE 1 SITE  RIGHT (CPT=19081)      benign      Family History   Problem Relation Age of Onset    Colon Polyps Father     Alcohol and Other Disorders Associated Father     Hypertension Father     Heart Attack Father 60    Other (colon polyps) Father     Cancer Mother         lung dx age 60s, Cevical CA dx age 50s    Colon Cancer Mother 83    Breast Cancer Mother         dx age 83    Diabetes Mother     No Known Problems Daughter     No Known Problems Son     No Known Problems Son     No Known Problems Maternal Grandmother     Stroke Maternal Grandfather     No Known Problems Paternal Grandmother     No Known Problems Paternal Grandfather     Diabetes Sister     Breast Cancer Sister         dx age 63,  age 64.    Diabetes Brother     Heart Attack Brother 59    Stroke Brother     Colon Cancer Maternal Aunt         dx age unknown    Prostate Cancer Neg     Pancreatic Cancer Neg     Uterine Cancer Neg     Ovarian Cancer Neg     Endometriosis Neg     Infertility Neg       Social History:   Social History     Socioeconomic History    Marital status:    Occupational History    Occupation: OWNER     Employer: SELF EMPLOYED    Occupation:    Tobacco Use    Smoking status: Never    Smokeless tobacco: Never   Vaping Use    Vaping Use: Never used   Substance and Sexual Activity    Alcohol use: Yes     Alcohol/week: 1.0 - 2.0 standard drink of alcohol     Types: 1 - 2 Standard drinks or equivalent per week    Drug use: No   Other Topics Concern    Caffeine Concern No     Comment: 1 decaf tea weekly    Exercise Yes     Comment: 3-4 x/week- walks    Seat Belt Yes    Self-Exams Yes     Comment: self breast exam every 2 months        REVIEW OF SYSTEMS:   GENERAL: feels well otherwise  LUNGS: denies shortness of breath  CARDIOVASCULAR: denies chest pain  GI: denies abdominal pain,  No constipation or diarrhea  : denies dysuria, vaginal discharge or itching    EXAM:   /66   Pulse 70   Temp 97.5 °F (36.4 °C)    Resp 16   Ht 5' 1.5\" (1.562 m)   Wt 120 lb 6.4 oz (54.6 kg)   BMI 22.38 kg/m²   Body mass index is 22.38 kg/m².   GENERAL: well developed, well nourished,in no apparent distress  SKIN: no rashes  HEENT: atraumatic, normocephalic, TMs normal  EYES: EOMI,conjunctiva are clear  NECK: supple,no adenopathy, no thyromegaly  BREAST: no dominant or suspicious mass, no nipple discharge  LUNGS: clear to auscultation  CARDIO: RRR without murmur  GI: good BS's,no masses, HSM or tenderness  : /  EXTREMITIES: no edema    ASSESSMENT AND PLAN:   Sarai Vaz is a 68 year old female who presents for a complete physical exam.  1. Routine general medical examination at a health care facility  Pap UTD  Recommend mammogram - pt declines  Colon UTD    2. Pure hypercholesterolemia  LDL elevated, but heart scan normal.   Pt declines statin    No orders of the defined types were placed in this encounter.      Meds & Refills for this Visit:  Requested Prescriptions      No prescriptions requested or ordered in this encounter       Imaging & Consults:  None

## 2024-02-26 ENCOUNTER — HOSPITAL ENCOUNTER (OUTPATIENT)
Age: 69
Discharge: HOME | End: 2024-02-26
Payer: COMMERCIAL

## 2024-02-26 VITALS
SYSTOLIC BLOOD PRESSURE: 128 MMHG | RESPIRATION RATE: 24 BRPM | HEART RATE: 95 BPM | OXYGEN SATURATION: 99 % | TEMPERATURE: 98 F | DIASTOLIC BLOOD PRESSURE: 68 MMHG

## 2024-02-26 DIAGNOSIS — J06.9 VIRAL URI WITH COUGH: Primary | ICD-10-CM

## 2024-02-26 DIAGNOSIS — J04.0 ACUTE LARYNGITIS: ICD-10-CM

## 2024-02-26 LAB
POCT INFLUENZA A: NEGATIVE
POCT INFLUENZA B: NEGATIVE
S PYO AG THROAT QL: NEGATIVE

## 2024-02-26 PROCEDURE — 87880 STREP A ASSAY W/OPTIC: CPT | Performed by: NURSE PRACTITIONER

## 2024-02-26 PROCEDURE — 87502 INFLUENZA DNA AMP PROBE: CPT | Performed by: NURSE PRACTITIONER

## 2024-02-26 PROCEDURE — 99203 OFFICE O/P NEW LOW 30 MIN: CPT | Performed by: NURSE PRACTITIONER

## 2024-02-26 NOTE — ED INITIAL ASSESSMENT (HPI)
Pt c/o 5 days of cough, stuffy nose, sneezing, hoarse voice and sore throat.  Pt states she had a negative home covid test.

## 2024-02-26 NOTE — ED PROVIDER NOTES
Patient Seen in: [facility]      History     Chief Complaint   Patient presents with    Cough/URI    Sore Throat    Sneezing    Stuffy Nose    Hoarseness     Stated Complaint: Flu like symptoms    Subjective:   HPI      Patient is a pleasant 68-year-old female with no significant past medical history here for evaluation of 4 to 5-day history of nasal congestion and laryngitis.  Since onset of symptoms, patient has had a negative home COVID test.  Tolerating p.o. intake with no nausea, vomiting or diarrhea.  Has taken aspirin \"every once in a while.\"    Denies fevers, chills or bodyaches.  Does states she does not feel well.    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  No known exposure specifically to COVID-19 or influenza.    Objective:   Past Medical History:   Diagnosis Date    Atypical mole     Pain in joints               Past Surgical History:   Procedure Laterality Date          COLONOSCOPY      LUIS BIOPSY STEREO NODULE 1 SITE RIGHT (CPT=19081)      benign                Social History     Socioeconomic History    Marital status:    Occupational History    Occupation: OWNER     Employer: SELF EMPLOYED    Occupation:    Tobacco Use    Smoking status: Never    Smokeless tobacco: Never   Vaping Use    Vaping Use: Never used   Substance and Sexual Activity    Alcohol use: Yes     Alcohol/week: 1.0 - 2.0 standard drink of alcohol     Types: 1 - 2 Standard drinks or equivalent per week    Drug use: No   Other Topics Concern    Caffeine Concern No     Comment: 1 decaf tea weekly    Exercise Yes     Comment: 3-4 x/week- walks    Seat Belt Yes    Self-Exams Yes     Comment: self breast exam every 2 months              Review of Systems    Positive for stated complaint: Flu like symptoms  Other systems are as noted in HPI.  Constitutional and vital signs reviewed.      All other systems reviewed and negative except as noted  above.    Physical Exam     ED Triage Vitals [02/26/24 1244]   /68   Pulse 95   Resp 24   Temp 97.7 °F (36.5 °C)   Temp src Temporal   SpO2 99 %   O2 Device None (Room air)       Current:/68   Pulse 95   Temp 97.7 °F (36.5 °C) (Temporal)   Resp 24   SpO2 99%         Physical Exam        ED Course     Labs Reviewed   POCT RAPID STREP - Normal   POCT FLU TEST - Normal    Narrative:     This assay is a rapid molecular in vitro test utilizing nucleic acid amplification of influenza A and B viral RNA.                  MDM                         Medical Decision Making  Differentials include but are not limited to viral URI, strep pharyngitis and less likely pneumonia.  Reassuring physical exam, lung sounds are clear, patient is afebrile.  Negative rapid flu and strep testing here today.  Will plan for supportive treatment and over-the-counter medication for symptom management.  Quarantine guidelines, monitoring parameters and follow-up precautions reviewed with patient, agree with plan of care.  All questions answered to patient's satisfaction.    Amount and/or Complexity of Data Reviewed  Labs: ordered. Decision-making details documented in ED Course.        Disposition and Plan     Clinical Impression:  1. Viral URI with cough    2. Acute laryngitis         Disposition:  Discharge  2/26/2024  1:31 pm    Follow-up:  Yaneth Gallagher MD  [address]  [facility] [id number] [phone]      As needed          Medications Prescribed:  Discharge Medication List as of 2/26/2024  1:40 PM

## 2024-06-25 ENCOUNTER — APPOINTMENT (OUTPATIENT)
Dept: OTHER | Facility: HOSPITAL | Age: 69
End: 2024-06-25
Attending: FAMILY MEDICINE

## 2024-06-27 ENCOUNTER — APPOINTMENT (OUTPATIENT)
Dept: OTHER | Facility: HOSPITAL | Age: 69
End: 2024-06-27
Attending: FAMILY MEDICINE

## 2025-06-11 ENCOUNTER — PATIENT OUTREACH (OUTPATIENT)
Dept: FAMILY MEDICINE CLINIC | Facility: CLINIC | Age: 70
End: 2025-06-11

## 2025-06-23 ENCOUNTER — TELEPHONE (OUTPATIENT)
Dept: FAMILY MEDICINE CLINIC | Facility: CLINIC | Age: 70
End: 2025-06-23

## 2025-06-23 DIAGNOSIS — Z00.00 ROUTINE GENERAL MEDICAL EXAMINATION AT A HEALTH CARE FACILITY: Primary | ICD-10-CM

## 2025-06-23 NOTE — TELEPHONE ENCOUNTER
Please enter lab orders for the patient's upcoming physical appointment.     Physical scheduled:   Your appointments       Date & Time Appointment Department ([place])    Jul 22, 2025 9:30 AM CDT Physical - Established with Yaneth Gallagher MD [facility] ([facility])              [facility]  [address]  [facility] [address]  [phone]           Preferred lab: [facility] ([facility])     The patient has been notified to complete fasting labs prior to their physical appointment.

## 2025-07-22 ENCOUNTER — OFFICE VISIT (OUTPATIENT)
Dept: FAMILY MEDICINE CLINIC | Facility: CLINIC | Age: 70
End: 2025-07-22
Payer: MEDICARE

## 2025-07-22 VITALS
BODY MASS INDEX: 22.23 KG/M2 | WEIGHT: 119.25 LBS | HEIGHT: 61.46 IN | SYSTOLIC BLOOD PRESSURE: 100 MMHG | RESPIRATION RATE: 16 BRPM | OXYGEN SATURATION: 95 % | DIASTOLIC BLOOD PRESSURE: 60 MMHG | HEART RATE: 78 BPM

## 2025-07-22 DIAGNOSIS — E78.00 PURE HYPERCHOLESTEROLEMIA: ICD-10-CM

## 2025-07-22 DIAGNOSIS — Z78.0 POSTMENOPAUSAL ESTROGEN DEFICIENCY: ICD-10-CM

## 2025-07-22 DIAGNOSIS — Z00.00 ENCOUNTER FOR ANNUAL HEALTH EXAMINATION: Primary | ICD-10-CM

## 2025-07-22 PROCEDURE — 3078F DIAST BP <80 MM HG: CPT | Performed by: FAMILY MEDICINE

## 2025-07-22 PROCEDURE — 96160 PT-FOCUSED HLTH RISK ASSMT: CPT | Performed by: FAMILY MEDICINE

## 2025-07-22 PROCEDURE — 3074F SYST BP LT 130 MM HG: CPT | Performed by: FAMILY MEDICINE

## 2025-07-22 PROCEDURE — 1160F RVW MEDS BY RX/DR IN RCRD: CPT | Performed by: FAMILY MEDICINE

## 2025-07-22 PROCEDURE — 1159F MED LIST DOCD IN RCRD: CPT | Performed by: FAMILY MEDICINE

## 2025-07-22 PROCEDURE — 1125F AMNT PAIN NOTED PAIN PRSNT: CPT | Performed by: FAMILY MEDICINE

## 2025-07-22 PROCEDURE — G0402 INITIAL PREVENTIVE EXAM: HCPCS | Performed by: FAMILY MEDICINE

## 2025-07-22 PROCEDURE — 1170F FXNL STATUS ASSESSED: CPT | Performed by: FAMILY MEDICINE

## 2025-07-22 PROCEDURE — 3008F BODY MASS INDEX DOCD: CPT | Performed by: FAMILY MEDICINE

## 2025-07-22 NOTE — PROGRESS NOTES
Subjective:   Sarai Vaz is a 70 year old female who presents for a MA AHA (Medicare Advantage Annual Health Assessment) and Initial Annual Wellness Visit (outside the first 12 months of Medicare eligibility, no prior AWV) and scheduled follow up of multiple significant but stable problems.             Immunizations:  Tdap:  declines, Flu:  declines, Prenvar:  declines, Shingles:  declines    Retired  for ; retired 2024.  Spending time with 2 grandkids, gardening.     Mom, sister breast cancer.  Pt last mammogram 2019.  Pt declines testing.     Hyperlipidemia:  due for labs.  LDL elevated in the past, declines statin  Heart scan ; 2022:  score 0.     History/Other:   Fall Risk Assessment:   She has been screened for Falls and is low risk.      Cognitive Assessment:   She had a completely normal cognitive assessment - see flowsheet entries     Functional Ability/Status:   Sarai Vaz has a completely normal functional assessment. See flowsheet for details.      Depression Screening (PHQ):  PHQ-2 SCORE: 0  , done 2025       Advanced Directives:   She does NOT have a Living Will. [Do you have a living will?: No]  She does NOT have a Power of  for Health Care. [Do you have a healthcare power of ?: No]      Problem List[1]  Allergies:  She has no known allergies.    Current Medications:  Active Meds, Sig Only[2]    Medical History:  She  has a past medical history of Atypical mole and Pain in joints.  Surgical History:  She  has a past surgical history that includes colonoscopy; ; and ana biopsy stereo nodule 1 site right (cpt=19081) ().   Family History:  Her family history includes Alcohol and Other Disorders Associated in her father; Breast Cancer in her mother and sister; Cancer in her mother; Colon Cancer in her maternal aunt; Colon Cancer (age of onset: 83) in her mother; Colon Polyps in her father; Diabetes in her brother, mother, and sister; Heart  Attack (age of onset: 59) in her brother; Heart Attack (age of onset: 60) in her father; Hypertension in her father; No Known Problems in her daughter, maternal grandmother, paternal grandfather, paternal grandmother, son, and son; Stroke in her brother and maternal grandfather; colon polyps in her father.  Social History:  She  reports that she has never smoked. She has never used smokeless tobacco. She reports that she does not currently use alcohol after a past usage of about 1.0 - 2.0 standard drink of alcohol per week. She reports that she does not use drugs.    Tobacco:  She has never smoked tobacco.    CAGE Alcohol Screen:   CAGE screening score of 0 on 7/22/2025, showing low risk of alcohol abuse.      Patient Care Team:  Yaneth Gallagher MD as PCP - General (Family Practice)    Review of Systems  GEN:  No fever or fatigue  HEENT:  No vision or hearing concerns.  No dental problems.  HEART:  No chest pain or palpitations  LUNG:  No SOB, cough or wheeze  GI:  No abdominal pain.  No N/V/D/C  :  No dysuria  EXT:  No joint pain.  No LE swelling  PSYCH:  No mood concerns or anxiety    Objective:   Physical Exam  GEN:  Alert, NAD  HEENT:  PERRL, no nasal discharge, O/P normal without erythema or exudate, dentition good  NECK:  No LAD, no thyromegaly  HEART:  RRR, no MRGs  LUNG:  CTA bilaterally, no RRWs  AB:  Soft, nontender, nondistended.  No palpable masses  EXT: no pedal edema  PSYCH:  Mood appropriate    /60   Pulse 78   Resp 16   Ht 5' 1.46\" (1.561 m)   Wt 119 lb 4 oz (54.1 kg)   SpO2 95%   BMI 22.20 kg/m²  Estimated body mass index is 22.2 kg/m² as calculated from the following:    Height as of this encounter: 5' 1.46\" (1.561 m).    Weight as of this encounter: 119 lb 4 oz (54.1 kg).    Medicare Hearing Assessment:   Hearing Screening    Time taken: 7/22/2025  9:34 AM  Screening Method: Whisper Test  Whisper Test Result: Pass         Visual Acuity:   Right Eye Visual Acuity: Uncorrected Right  Eye Chart Acuity: 20/70   Left Eye Visual Acuity: Uncorrected Left Eye Chart Acuity: 20/70   Both Eyes Visual Acuity: Uncorrected Both Eyes Chart Acuity: 20/70   Able To Tolerate Visual Acuity: Yes        Assessment & Plan:   Sarai Vaz is a 70 year old female who presents for a Medicare Assessment.     1. Encounter for annual health examination (Primary)  2. Postmenopausal estrogen deficiency  -     XR DEXA BONE DENSITOMETRY (CPT=77080); Future; Expected date: 07/22/2025  3. Pure hypercholesterolemia  -     Lipid Panel; Future; Expected date: 07/22/2025  -     Comp Metabolic Panel (14); Future; Expected date: 07/22/2025            Vaccines declined  Mammogram declined  dEXA ordered  Labs ordered      Return in 1 year (on 7/22/2026).     Yaneth Gallagher MD    Supplementary Documentation:   General Health:  In the past six months, have you lost more than 10 pounds without trying?: 2 - No  Has your appetite been poor?: No  Type of Diet: Balanced  How does the patient maintain a good energy level?: Appropriate Exercise, Daily Walks  How would you describe your daily physical activity?: Heavy  How would you describe your current health state?: Good  How do you maintain positive mental well-being?: Social Interaction, Visiting Family  On a scale of 0 to 10, with 0 being no pain and 10 being severe pain, what is your pain level?: 3 - (Mild)  In the past six months, have you experienced urine leakage?: 0-No  At any time do you feel concerned for the safety/well-being of yourself and/or your children, in your home or elsewhere?: No  Have you had any immunizations at another office such as Influenza, Hepatitis B, Tetanus, or Pneumococcal?: No    Health Maintenance   Topic Date Due    Pneumococcal Vaccine: 50+ Years (1 of 1 - PCV) Never done    Zoster Vaccines (1 of 2) Never done    DEXA Scan  Never done    Mammogram  11/15/2020    COVID-19 Vaccine (3 - 2024-25 season) 09/01/2024    Annual Well Visit  Never done     Influenza Vaccine (1) 10/01/2025    Colorectal Cancer Screening  11/01/2026    Annual Depression Screening  Completed    Fall Risk Screening (Annual)  Completed    Meningococcal B Vaccine  Aged Out            [1]   Patient Active Problem List  Diagnosis    History of adenomatous polyp of colon    Family history of colon cancer    Pure hypercholesterolemia   [2]   Outpatient Medications Marked as Taking for the 7/22/25 encounter (Office Visit) with Yaneth Gallagher MD   Medication Sig    Multiple Vitamin (ONE-DAILY MULTI VITAMINS) Oral Tab Take 1 tablet by mouth daily.    NON FORMULARY Take 2 tablets by mouth daily. Calcium-Zinc-Magnesium

## 2025-07-29 ENCOUNTER — HOSPITAL ENCOUNTER (OUTPATIENT)
Dept: BONE DENSITY | Age: 70
Discharge: HOME | End: 2025-07-29
Attending: FAMILY MEDICINE

## 2025-07-29 DIAGNOSIS — Z78.0 POSTMENOPAUSAL ESTROGEN DEFICIENCY: ICD-10-CM

## 2025-07-29 PROCEDURE — 77080 DXA BONE DENSITY AXIAL: CPT | Performed by: FAMILY MEDICINE

## 2025-07-31 ENCOUNTER — LAB ENCOUNTER (OUTPATIENT)
Dept: LAB | Age: 70
End: 2025-07-31
Attending: FAMILY MEDICINE

## 2025-07-31 DIAGNOSIS — E78.00 PURE HYPERCHOLESTEROLEMIA: ICD-10-CM

## 2025-07-31 LAB
ALBUMIN SERPL-MCNC: 4.6 G/DL (ref 3.2–4.8)
ALBUMIN/GLOB SERPL: 1.6 (ref 1–2)
ALP LIVER SERPL-CCNC: 67 U/L (ref 55–142)
ALT SERPL-CCNC: 14 U/L (ref 10–49)
ANION GAP SERPL CALC-SCNC: 8 MMOL/L (ref 0–18)
AST SERPL-CCNC: 22 U/L (ref ?–34)
BILIRUB SERPL-MCNC: 0.7 MG/DL (ref 0.2–1.1)
BUN BLD-MCNC: 15 MG/DL (ref 9–23)
CALCIUM BLD-MCNC: 9.6 MG/DL (ref 8.7–10.6)
CHLORIDE SERPL-SCNC: 106 MMOL/L (ref 98–112)
CHOLEST SERPL-MCNC: 240 MG/DL (ref ?–200)
CO2 SERPL-SCNC: 30 MMOL/L (ref 21–32)
CREAT BLD-MCNC: 0.73 MG/DL (ref 0.55–1.02)
EGFRCR SERPLBLD CKD-EPI 2021: 88 ML/MIN/1.73M2 (ref 60–?)
FASTING PATIENT LIPID ANSWER: YES
FASTING STATUS PATIENT QL REPORTED: YES
GLOBULIN PLAS-MCNC: 2.8 G/DL (ref 2–3.5)
GLUCOSE BLD-MCNC: 83 MG/DL (ref 70–99)
HDLC SERPL-MCNC: 59 MG/DL (ref 40–59)
LDLC SERPL CALC-MCNC: 168 MG/DL (ref ?–100)
NONHDLC SERPL-MCNC: 181 MG/DL (ref ?–130)
OSMOLALITY SERPL CALC.SUM OF ELEC: 298 MOSM/KG (ref 275–295)
POTASSIUM SERPL-SCNC: 3.6 MMOL/L (ref 3.5–5.1)
PROT SERPL-MCNC: 7.4 G/DL (ref 5.7–8.2)
SODIUM SERPL-SCNC: 144 MMOL/L (ref 136–145)
TRIGL SERPL-MCNC: 75 MG/DL (ref 30–149)
VLDLC SERPL CALC-MCNC: 15 MG/DL (ref 0–30)

## 2025-07-31 PROCEDURE — 80061 LIPID PANEL: CPT

## 2025-07-31 PROCEDURE — 36415 COLL VENOUS BLD VENIPUNCTURE: CPT

## 2025-07-31 PROCEDURE — 80053 COMPREHEN METABOLIC PANEL: CPT

## 2025-08-07 ENCOUNTER — ORDER TRANSCRIPTION (OUTPATIENT)
Dept: ADMINISTRATIVE | Facility: HOSPITAL | Age: 70
End: 2025-08-07

## 2025-08-07 DIAGNOSIS — Z13.6 SCREENING FOR CARDIOVASCULAR CONDITION: Primary | ICD-10-CM

## (undated) NOTE — LETTER
Date & Time: 2/26/2024, 1:31 PM  Patient: Sarai Vaz  Encounter Provider(s):    Brenda Pritchett APRN       To Whom It May Concern:    Sarai Vaz was seen and treated in our department on 2/26/2024. She should not return to work until 2/29/24 .    If you have any questions or concerns, please do not hesitate to call.        _____________________________  Physician/APC Signature

## (undated) NOTE — MR AVS SNAPSHOT
[address]  [facility],  [address] [place] [facility] [id number]               Thank you for choosing us for your health care visit with Nelida Joy PA-C.   We are glad to serve you and happy to provide you with [facility] visit,  view other health information, and more. To sign up or find more information, go to [email or web address] and click on the Sign Up Now link in the [employer] box.      Enter your [employer] Activation Code exactly as it appears below along with yo